# Patient Record
Sex: FEMALE | Race: AMERICAN INDIAN OR ALASKA NATIVE | NOT HISPANIC OR LATINO | URBAN - METROPOLITAN AREA
[De-identification: names, ages, dates, MRNs, and addresses within clinical notes are randomized per-mention and may not be internally consistent; named-entity substitution may affect disease eponyms.]

---

## 2017-03-29 ENCOUNTER — OUTPATIENT (OUTPATIENT)
Dept: OUTPATIENT SERVICES | Facility: HOSPITAL | Age: 82
LOS: 1 days | Discharge: HOME | End: 2017-03-29

## 2017-06-27 DIAGNOSIS — E11.65 TYPE 2 DIABETES MELLITUS WITH HYPERGLYCEMIA: ICD-10-CM

## 2017-06-27 DIAGNOSIS — E06.3 AUTOIMMUNE THYROIDITIS: ICD-10-CM

## 2017-06-27 DIAGNOSIS — E78.00 PURE HYPERCHOLESTEROLEMIA, UNSPECIFIED: ICD-10-CM

## 2017-09-20 ENCOUNTER — OUTPATIENT (OUTPATIENT)
Dept: OUTPATIENT SERVICES | Facility: HOSPITAL | Age: 82
LOS: 1 days | Discharge: HOME | End: 2017-09-20

## 2017-09-20 DIAGNOSIS — E11.65 TYPE 2 DIABETES MELLITUS WITH HYPERGLYCEMIA: ICD-10-CM

## 2017-09-20 DIAGNOSIS — R06.02 SHORTNESS OF BREATH: ICD-10-CM

## 2017-09-20 DIAGNOSIS — E06.3 AUTOIMMUNE THYROIDITIS: ICD-10-CM

## 2017-09-20 DIAGNOSIS — E78.00 PURE HYPERCHOLESTEROLEMIA, UNSPECIFIED: ICD-10-CM

## 2017-10-06 ENCOUNTER — EMERGENCY (EMERGENCY)
Facility: HOSPITAL | Age: 82
LOS: 0 days | Discharge: HOME | End: 2017-10-06

## 2017-10-06 DIAGNOSIS — E11.9 TYPE 2 DIABETES MELLITUS WITHOUT COMPLICATIONS: ICD-10-CM

## 2017-10-06 DIAGNOSIS — M25.532 PAIN IN LEFT WRIST: ICD-10-CM

## 2017-10-06 DIAGNOSIS — I10 ESSENTIAL (PRIMARY) HYPERTENSION: ICD-10-CM

## 2017-10-06 DIAGNOSIS — R06.02 SHORTNESS OF BREATH: ICD-10-CM

## 2017-10-06 DIAGNOSIS — Z79.899 OTHER LONG TERM (CURRENT) DRUG THERAPY: ICD-10-CM

## 2018-02-21 ENCOUNTER — OUTPATIENT (OUTPATIENT)
Dept: OUTPATIENT SERVICES | Facility: HOSPITAL | Age: 83
LOS: 1 days | Discharge: HOME | End: 2018-02-21

## 2018-02-21 DIAGNOSIS — N39.0 URINARY TRACT INFECTION, SITE NOT SPECIFIED: ICD-10-CM

## 2018-07-26 ENCOUNTER — OUTPATIENT (OUTPATIENT)
Dept: OUTPATIENT SERVICES | Facility: HOSPITAL | Age: 83
LOS: 1 days | Discharge: HOME | End: 2018-07-26

## 2018-07-26 DIAGNOSIS — E11.65 TYPE 2 DIABETES MELLITUS WITH HYPERGLYCEMIA: ICD-10-CM

## 2018-07-26 DIAGNOSIS — E78.00 PURE HYPERCHOLESTEROLEMIA, UNSPECIFIED: ICD-10-CM

## 2018-07-26 DIAGNOSIS — E06.3 AUTOIMMUNE THYROIDITIS: ICD-10-CM

## 2018-11-30 ENCOUNTER — OUTPATIENT (OUTPATIENT)
Dept: OUTPATIENT SERVICES | Facility: HOSPITAL | Age: 83
LOS: 1 days | Discharge: HOME | End: 2018-11-30

## 2018-11-30 DIAGNOSIS — D64.9 ANEMIA, UNSPECIFIED: ICD-10-CM

## 2018-11-30 DIAGNOSIS — E78.00 PURE HYPERCHOLESTEROLEMIA, UNSPECIFIED: ICD-10-CM

## 2018-11-30 DIAGNOSIS — I10 ESSENTIAL (PRIMARY) HYPERTENSION: ICD-10-CM

## 2019-06-12 ENCOUNTER — OUTPATIENT (OUTPATIENT)
Dept: OUTPATIENT SERVICES | Facility: HOSPITAL | Age: 84
LOS: 1 days | Discharge: HOME | End: 2019-06-12

## 2019-06-12 DIAGNOSIS — E78.00 PURE HYPERCHOLESTEROLEMIA, UNSPECIFIED: ICD-10-CM

## 2019-06-12 DIAGNOSIS — E11.65 TYPE 2 DIABETES MELLITUS WITH HYPERGLYCEMIA: ICD-10-CM

## 2019-06-12 DIAGNOSIS — E06.3 AUTOIMMUNE THYROIDITIS: ICD-10-CM

## 2019-06-12 DIAGNOSIS — M81.0 AGE-RELATED OSTEOPOROSIS WITHOUT CURRENT PATHOLOGICAL FRACTURE: ICD-10-CM

## 2019-12-09 ENCOUNTER — OUTPATIENT (OUTPATIENT)
Dept: OUTPATIENT SERVICES | Facility: HOSPITAL | Age: 84
LOS: 1 days | Discharge: HOME | End: 2019-12-09

## 2019-12-09 DIAGNOSIS — E11.65 TYPE 2 DIABETES MELLITUS WITH HYPERGLYCEMIA: ICD-10-CM

## 2019-12-09 DIAGNOSIS — E78.00 PURE HYPERCHOLESTEROLEMIA, UNSPECIFIED: ICD-10-CM

## 2020-11-13 ENCOUNTER — EMERGENCY (EMERGENCY)
Facility: HOSPITAL | Age: 85
LOS: 0 days | Discharge: HOME | End: 2020-11-14
Attending: STUDENT IN AN ORGANIZED HEALTH CARE EDUCATION/TRAINING PROGRAM | Admitting: STUDENT IN AN ORGANIZED HEALTH CARE EDUCATION/TRAINING PROGRAM
Payer: MEDICARE

## 2020-11-13 VITALS
DIASTOLIC BLOOD PRESSURE: 74 MMHG | RESPIRATION RATE: 18 BRPM | WEIGHT: 134.92 LBS | SYSTOLIC BLOOD PRESSURE: 181 MMHG | OXYGEN SATURATION: 99 % | HEART RATE: 90 BPM

## 2020-11-13 DIAGNOSIS — R20.0 ANESTHESIA OF SKIN: ICD-10-CM

## 2020-11-13 DIAGNOSIS — E78.5 HYPERLIPIDEMIA, UNSPECIFIED: ICD-10-CM

## 2020-11-13 DIAGNOSIS — R20.2 PARESTHESIA OF SKIN: ICD-10-CM

## 2020-11-13 DIAGNOSIS — E11.9 TYPE 2 DIABETES MELLITUS WITHOUT COMPLICATIONS: ICD-10-CM

## 2020-11-13 DIAGNOSIS — I10 ESSENTIAL (PRIMARY) HYPERTENSION: ICD-10-CM

## 2020-11-13 DIAGNOSIS — U07.1 COVID-19: ICD-10-CM

## 2020-11-13 DIAGNOSIS — G45.9 TRANSIENT CEREBRAL ISCHEMIC ATTACK, UNSPECIFIED: ICD-10-CM

## 2020-11-13 DIAGNOSIS — Z87.891 PERSONAL HISTORY OF NICOTINE DEPENDENCE: ICD-10-CM

## 2020-11-13 DIAGNOSIS — R29.810 FACIAL WEAKNESS: ICD-10-CM

## 2020-11-13 LAB
ALBUMIN SERPL ELPH-MCNC: 3.8 G/DL — SIGNIFICANT CHANGE UP (ref 3.5–5.2)
ALP SERPL-CCNC: 48 U/L — SIGNIFICANT CHANGE UP (ref 30–115)
ALT FLD-CCNC: 41 U/L — SIGNIFICANT CHANGE UP (ref 0–41)
ANION GAP SERPL CALC-SCNC: 10 MMOL/L — SIGNIFICANT CHANGE UP (ref 7–14)
APPEARANCE UR: CLEAR — SIGNIFICANT CHANGE UP
APTT BLD: 27.1 SEC — SIGNIFICANT CHANGE UP (ref 27–39.2)
AST SERPL-CCNC: 47 U/L — HIGH (ref 0–41)
BACTERIA # UR AUTO: NEGATIVE — SIGNIFICANT CHANGE UP
BASOPHILS # BLD AUTO: 0.03 K/UL — SIGNIFICANT CHANGE UP (ref 0–0.2)
BASOPHILS NFR BLD AUTO: 0.4 % — SIGNIFICANT CHANGE UP (ref 0–1)
BILIRUB SERPL-MCNC: 0.4 MG/DL — SIGNIFICANT CHANGE UP (ref 0.2–1.2)
BILIRUB UR-MCNC: NEGATIVE — SIGNIFICANT CHANGE UP
BUN SERPL-MCNC: 14 MG/DL — SIGNIFICANT CHANGE UP (ref 10–20)
CALCIUM SERPL-MCNC: 8.6 MG/DL — SIGNIFICANT CHANGE UP (ref 8.5–10.1)
CHLORIDE SERPL-SCNC: 101 MMOL/L — SIGNIFICANT CHANGE UP (ref 98–110)
CO2 SERPL-SCNC: 28 MMOL/L — SIGNIFICANT CHANGE UP (ref 17–32)
COLOR SPEC: SIGNIFICANT CHANGE UP
CREAT SERPL-MCNC: 0.7 MG/DL — SIGNIFICANT CHANGE UP (ref 0.7–1.5)
DIFF PNL FLD: NEGATIVE — SIGNIFICANT CHANGE UP
EOSINOPHIL # BLD AUTO: 0.07 K/UL — SIGNIFICANT CHANGE UP (ref 0–0.7)
EOSINOPHIL NFR BLD AUTO: 0.9 % — SIGNIFICANT CHANGE UP (ref 0–8)
EPI CELLS # UR: 1 /HPF — SIGNIFICANT CHANGE UP (ref 0–5)
GLUCOSE SERPL-MCNC: 117 MG/DL — HIGH (ref 70–99)
GLUCOSE UR QL: NEGATIVE — SIGNIFICANT CHANGE UP
HCT VFR BLD CALC: 22.9 % — LOW (ref 37–47)
HGB BLD-MCNC: 7.3 G/DL — LOW (ref 12–16)
HYALINE CASTS # UR AUTO: 0 /LPF — SIGNIFICANT CHANGE UP (ref 0–7)
IMM GRANULOCYTES NFR BLD AUTO: 0.4 % — HIGH (ref 0.1–0.3)
INR BLD: 0.9 RATIO — SIGNIFICANT CHANGE UP (ref 0.65–1.3)
KETONES UR-MCNC: SIGNIFICANT CHANGE UP
LEUKOCYTE ESTERASE UR-ACNC: ABNORMAL
LYMPHOCYTES # BLD AUTO: 1.92 K/UL — SIGNIFICANT CHANGE UP (ref 1.2–3.4)
LYMPHOCYTES # BLD AUTO: 24.5 % — SIGNIFICANT CHANGE UP (ref 20.5–51.1)
MCHC RBC-ENTMCNC: 27.4 PG — SIGNIFICANT CHANGE UP (ref 27–31)
MCHC RBC-ENTMCNC: 31.9 G/DL — LOW (ref 32–37)
MCV RBC AUTO: 86.1 FL — SIGNIFICANT CHANGE UP (ref 81–99)
MONOCYTES # BLD AUTO: 0.81 K/UL — HIGH (ref 0.1–0.6)
MONOCYTES NFR BLD AUTO: 10.3 % — HIGH (ref 1.7–9.3)
NEUTROPHILS # BLD AUTO: 4.98 K/UL — SIGNIFICANT CHANGE UP (ref 1.4–6.5)
NEUTROPHILS NFR BLD AUTO: 63.5 % — SIGNIFICANT CHANGE UP (ref 42.2–75.2)
NITRITE UR-MCNC: NEGATIVE — SIGNIFICANT CHANGE UP
NRBC # BLD: 0 /100 WBCS — SIGNIFICANT CHANGE UP (ref 0–0)
PH UR: 6.5 — SIGNIFICANT CHANGE UP (ref 5–8)
PLATELET # BLD AUTO: 228 K/UL — SIGNIFICANT CHANGE UP (ref 130–400)
POTASSIUM SERPL-MCNC: 4 MMOL/L — SIGNIFICANT CHANGE UP (ref 3.5–5)
POTASSIUM SERPL-SCNC: 4 MMOL/L — SIGNIFICANT CHANGE UP (ref 3.5–5)
PROT SERPL-MCNC: 6 G/DL — SIGNIFICANT CHANGE UP (ref 6–8)
PROT UR-MCNC: SIGNIFICANT CHANGE UP
PROTHROM AB SERPL-ACNC: 10.3 SEC — SIGNIFICANT CHANGE UP (ref 9.95–12.87)
RAPID RVP RESULT: DETECTED
RBC # BLD: 2.66 M/UL — LOW (ref 4.2–5.4)
RBC # FLD: 16.5 % — HIGH (ref 11.5–14.5)
RBC CASTS # UR COMP ASSIST: 2 /HPF — SIGNIFICANT CHANGE UP (ref 0–4)
SARS-COV-2 RNA SPEC QL NAA+PROBE: DETECTED
SODIUM SERPL-SCNC: 139 MMOL/L — SIGNIFICANT CHANGE UP (ref 135–146)
SP GR SPEC: 1.01 — SIGNIFICANT CHANGE UP (ref 1.01–1.03)
TROPONIN T SERPL-MCNC: <0.01 NG/ML — SIGNIFICANT CHANGE UP
UROBILINOGEN FLD QL: SIGNIFICANT CHANGE UP
WBC # BLD: 7.84 K/UL — SIGNIFICANT CHANGE UP (ref 4.8–10.8)
WBC # FLD AUTO: 7.84 K/UL — SIGNIFICANT CHANGE UP (ref 4.8–10.8)
WBC UR QL: 8 /HPF — HIGH (ref 0–5)

## 2020-11-13 PROCEDURE — 93010 ELECTROCARDIOGRAM REPORT: CPT

## 2020-11-13 PROCEDURE — 70450 CT HEAD/BRAIN W/O DYE: CPT | Mod: 26

## 2020-11-13 PROCEDURE — 99218: CPT | Mod: GC

## 2020-11-13 PROCEDURE — 71045 X-RAY EXAM CHEST 1 VIEW: CPT | Mod: 26

## 2020-11-13 NOTE — ED PROVIDER NOTE - PROGRESS NOTE DETAILS
Stroke Code called for NIHSS 0 <6h - seen by Neuro NP Min[place] - no need for CT Perfusion or CT Angios - if neg CT Head, recommend EDOU TIA workup.

## 2020-11-13 NOTE — ED PROVIDER NOTE - NS ED ROS FT
Constitutional: See HPI.  Eyes: No visual changes, eye pain or discharge. No Photophobia  ENMT: No hearing changes, pain, discharge or infections. No neck pain or stiffness. No limited ROM  Cardiac: No SOB or edema. No chest pain with exertion.  Respiratory: No cough or respiratory distress. No hemoptysis. No history of asthma or RAD.  GI: No nausea, vomiting, diarrhea or abdominal pain.  : No dysuria, frequency or burning. No Discharge  MS: No myalgia, muscle weakness, joint pain or back pain.  Neuro: see hpi   Skin: No skin rash.  Except as documented in the HPI, all other systems are negative.

## 2020-11-13 NOTE — ED PROVIDER NOTE - OBJECTIVE STATEMENT
84 yo f PMHx dm, and htn p/w acute onset left arm and face numbness starting an hour prior to arrival. Patient states that her arm numbness resolved but her facial weakness persisted On arrival a stroke code was activated bp 180/70  fs 108 and patients symptoms had resolved , nihss0.

## 2020-11-13 NOTE — ED CDU PROVIDER INITIAL DAY NOTE - ATTENDING CONTRIBUTION TO CARE
84 y/o F pmh htn, HLD, DM, placed in OBS for TIA w/up. had L sided facial and UE sx, resolved. NIHSS 0. CTH neg. Will fup MRI and echo, and reassess. pt has no complaints.

## 2020-11-13 NOTE — ED CDU PROVIDER INITIAL DAY NOTE - OBJECTIVE STATEMENT
84y/o female with pmh of htn, dm, hld, pt. c/o right hand numbness which started pta. sx resolved on arrival. denies ha, dizziness, focal weakness, cp, sob, fever, cough, slurred speech, abdominal pain. former smoker. pt. in obs under tia protocol.

## 2020-11-13 NOTE — CONSULT NOTE ADULT - SUBJECTIVE AND OBJECTIVE BOX
CC: left face and arm numbness       HPI:  86 yo right handed female with h/o dm, and htn p. w acute onset left arm and face numbness starting an hour prior to arrival. Patient states that her arm numbness resolved but her facial weakness persisted so she decided to come to the ed. On arrival a stroke code was activated bp 180/70  fs 108 and patients symptoms had resolved , nihss0.       Home Medications:  Asa 81 mg q 24  Does not remember the rest of the medication she takes        Social History  Denies smoking alcohol or drug use    Neuro Exam:  Orientation: oriented to person, place time and situation, speech and language is intact  Cranial Nerves: visual acuity intact bilaterally, visual fields full to confrontation, pupils equal round and reactive to light, extraocular motion intact, facial sensation intact symmetrically, face symmetrical, hearing was intact bilaterally, tongue and palate midline, head turning and shoulder shrug symmetric and there was no tongue deviation with protrusion.   Motor: 5/5 throughout/ no drift             No abnormal involuntary mvmts  Sensory exam:  intact and symmetric  Coordination:. no dysmetria or limb ataxia  Gait: deferred      NIHSS: 0  LOC 0   commands 0  questions 0  vf 0  Gaze 0  LUE 0  LLE 0  RUE 0  RLE 0  Face 0  Sensory 0  Ataxia 0  Speech 0  language 0  Extinction 0    ABCD2 4      Allergies    Allergy Status Unknown    Intolerances      MEDICATIONS  (STANDING):    MEDICATIONS  (PRN):      LABS:  POCT Blood Glucose (11.13.20 @ 21:26)   POCT Blood Glucose.: 108 mg/dL     Complete Blood Count + Automated Diff (11.13.20 @ 21:24)   WBC Count: 7.84 K/uL   RBC Count: 2.66 M/uL   Hemoglobin: 7.3 g/dL   Hematocrit: 22.9 %   Mean Cell Volume: 86.1 fL   Mean Cell Hemoglobin: 27.4 pg   Mean Cell Hemoglobin Conc: 31.9 g/dL   Red Cell Distrib Width: 16.5 %   Platelet Count - Automated: 228 K/uL   Auto Neutrophil #: 4.98 K/uL   Auto Lymphocyte #: 1.92 K/uL   Auto Monocyte #: 0.81 K/uL   Auto Eosinophil #: 0.07 K/uL   Auto Basophil #: 0.03 K/uL   Auto Neutrophil %: 63.5: Differential percentages must be correlated with absolute numbers for   clinical significance. %   Auto Lymphocyte %: 24.5 %   Auto Monocyte %: 10.3 %   Auto Eosinophil %: 0.9 %   Auto Basophil %: 0.4 %   Auto Immature Granulocyte %: 0.4 %   Nucleated RBC: 0 /100 WBCs         Neuro Imaging:  < from: CT Brain Stroke Protocol (11.13.20 @ 21:44) >  Findings:    Examination is limited in evaluation secondary to streak and motion artifact.    The ventricles, basal cisterns and sulcal pattern are within normal limits for the patient's stated age.    Bilateral basal ganglia calcification. Patchy periventricular deep white matter hypodensities consistent chronic microvascular ischemic changes. Bilateral chronic lacunar infarcts.    There is no acute mass effect, midline shift or intracranial hemorrhage.    The calvarium is intact.    The visualized paranasal sinuses and bilateral mastoid complexes are unremarkable. The globes and orbits are unremarkable.    Beam hardening artifact is noted overlying the brain stem and posterior fossa which is inherent to CT in this location.    Impression:    No CT evidence of acute intracranial hemorrhage or large territorial infarct.    Dr. Florencio Rodriguez discussed preliminary findings with ROSALINO MEJIA NP on 11/13/2020 9:49 PM with readback.          ******PRELIMINARY REPORT******    ******PRELIMINARY REPORT******          FLORENCIO RODRIGUEZ M.D., RESIDENT RADIOLOGIST    < end of copied text >    EEG:     Echo:   Carotid Doppler: N/A  Telemetry:

## 2020-11-13 NOTE — ED PROVIDER NOTE - ATTENDING CONTRIBUTION TO CARE
Pt here for 30min episode of R hand numbness that occurred 90 min ago.  No symptoms now.  No other complaints.    Exam: RRR, CTAB, NAD, NIHSS 0, GCS 15  Plan: labs, xr, ct head, ekg, neuro consult

## 2020-11-13 NOTE — ED ADULT TRIAGE NOTE - CHIEF COMPLAINT QUOTE
Amb with unsteady gait, c/o numbness and tingling to right hand/forearm and right side of face which began 1.5 hrs PTA, amb with crutch c/o LLE arthritis.  Brought directly to crit area, stroke code initiated, neuro wnl NIH 0

## 2020-11-13 NOTE — ED CDU PROVIDER INITIAL DAY NOTE - NEURO NEGATIVE STATEMENT, MLM
no loss of consciousness, no gait abnormality, no headache, + right hand numbness which resolved, no weakness.

## 2020-11-13 NOTE — ED CDU PROVIDER INITIAL DAY NOTE - MEDICAL DECISION MAKING DETAILS
86 y/o F pmh htn, HLD, DM, placed in OBS for TIA w/up. had L sided facial and UE sx, resolved. NIHSS 0. CTH neg. Will fup MRI and echo, and reassess. pt has no complaints.

## 2020-11-13 NOTE — CONSULT NOTE ADULT - ASSESSMENT
84 yo RHF with h/o dm, and htn p. w acute onset left arm and face numbness starting an hour prior to arrival.  On arrival a stroke code was activated bp 180/70  fs 108 and patients symptoms had resolved , nihss0. CTH was negative for acute intracranial findings. Patient was not a candidate for IV thrombolysis due to complete resolution of her symptoms. Case was discussed with neurology attending on call Dr Lenin Guerra.    TIA       Plan  N/C MRI BRAIN  MRA H/N  ECHO  HBAIC , LIPID PROFILE  Continue asa 81 mg q 24  start Lipitor 80 mg q 24

## 2020-11-14 VITALS
HEART RATE: 94 BPM | DIASTOLIC BLOOD PRESSURE: 80 MMHG | TEMPERATURE: 98 F | RESPIRATION RATE: 18 BRPM | SYSTOLIC BLOOD PRESSURE: 180 MMHG | OXYGEN SATURATION: 97 %

## 2020-11-14 LAB
A1C WITH ESTIMATED AVERAGE GLUCOSE RESULT: 6 % — HIGH (ref 4–5.6)
CHOLEST SERPL-MCNC: 165 MG/DL — SIGNIFICANT CHANGE UP
ESTIMATED AVERAGE GLUCOSE: 126 MG/DL — HIGH (ref 68–114)
HDLC SERPL-MCNC: 59 MG/DL — SIGNIFICANT CHANGE UP
LIPID PNL WITH DIRECT LDL SERPL: 92 MG/DL — SIGNIFICANT CHANGE UP
NON HDL CHOLESTEROL: 106 MG/DL — SIGNIFICANT CHANGE UP
TRIGL SERPL-MCNC: 92 MG/DL — SIGNIFICANT CHANGE UP

## 2020-11-14 PROCEDURE — 70551 MRI BRAIN STEM W/O DYE: CPT | Mod: 26

## 2020-11-14 PROCEDURE — 99217: CPT

## 2020-11-14 PROCEDURE — 93306 TTE W/DOPPLER COMPLETE: CPT | Mod: 26

## 2020-11-14 PROCEDURE — 70544 MR ANGIOGRAPHY HEAD W/O DYE: CPT | Mod: 26,59

## 2020-11-14 PROCEDURE — 70548 MR ANGIOGRAPHY NECK W/DYE: CPT | Mod: 26

## 2020-11-14 NOTE — ED CDU PROVIDER DISPOSITION NOTE - NSFOLLOWUPINSTRUCTIONS_ED_ALL_ED_FT
YOU ARE POSITIVE FOR COVID, YOU NEED TO QUARANTINE FOR 14 DAYS  Follow up with your PMD in 14 days  Continue all your home medications  Follow up with Neurology in 2 weeks

## 2020-11-14 NOTE — ED CDU PROVIDER DISPOSITION NOTE - CLINICAL COURSE
86 y/o F pmh htn, HLD, DM, placed in OBS for TIA w/up. had L sided facial and UE sx, resolved. NIHSS 0. CTH neg. Will fup MRI and echo, and reassess. pt has no complaints. COVID +    MRI neg for acute serious pathology. Echo WNL. I had extensive conversation with pt about COVID+ status, anemia. She denied resp/ ENT sx, abnl bleeding, v/d, fever. No syncope, cp, or SOB. I advised pt to quarantine/ social distance and much as possible, wash hands, use mask; also f/up with pmd regarding anemia. Pt understood, expressed eagerness to return home. Pt understands signs and symptoms for ED return. dc home.

## 2020-11-14 NOTE — ED CDU PROVIDER DISPOSITION NOTE - CARE PROVIDER_API CALL
Jn Perea  INTERNAL MEDICINE  [address] [place] [address]  Phone: [phone]  Fax: [phone]  Follow Up Time: 7-10 Days

## 2020-11-14 NOTE — ED CDU PROVIDER DISPOSITION NOTE - PATIENT PORTAL LINK FT
You can access the FollowMyHealth Patient Portal offered by [facility] by registering at the following website: [email or web address]. By joining [employer]’s FollowMyHealth portal, you will also be able to view your health information using other applications (apps) compatible with our system.

## 2020-11-14 NOTE — ED CDU PROVIDER SUBSEQUENT DAY NOTE - MEDICAL DECISION MAKING DETAILS
86 y/o F pmh htn, HLD, DM, placed in OBS for TIA w/up. had L sided facial and UE sx, resolved. NIHSS 0. CTH neg. Will fup MRI and echo, and reassess. pt has no complaints. COVID +

## 2020-11-14 NOTE — ED ADULT NURSE NOTE - PMH
[place] infant of 38 completed weeks of gestation
[place] infant of 38 completed weeks of gestation
DM (diabetes mellitus)    HLD (hyperlipidemia)    HTN (hypertension)

## 2020-11-14 NOTE — ED CDU PROVIDER DISPOSITION NOTE - NSFOLLOWUPCLINICS_GEN_ALL_ED_FT
[facility]  Neurology  [address] [place] [address], [facility] [address]  [place] [address]  Phone: [phone]  Fax:   Follow Up Time:

## 2020-11-14 NOTE — ED CDU PROVIDER SUBSEQUENT DAY NOTE - ATTENDING CONTRIBUTION TO CARE
84 y/o F pmh htn, HLD, DM, placed in OBS for TIA w/up. had L sided facial and UE sx, resolved. NIHSS 0. CTH neg. Will fup MRI and echo, and reassess. pt has no complaints. COVID +

## 2020-11-14 NOTE — ED CDU PROVIDER SUBSEQUENT DAY NOTE - PROGRESS NOTE DETAILS
no focal deficits on neuro exam, pt. asymptomatic, covid +, plan for bedside echo and mri/mra  as per neuro Arrived overnight, received from DEEDEE Gamez. Pt presenting under the TIA protocol. Pt complained of right hand numbness. CT Brain Stroke Protocol negative for any acute findings. Pt seen by neuro recommend MRI/MRA and ECHO. Pt is COVID POSITIVE. Will continue to monitor

## 2022-01-01 ENCOUNTER — APPOINTMENT (OUTPATIENT)
Dept: ENDOCRINOLOGY | Facility: CLINIC | Age: 87
End: 2022-01-01

## 2022-08-08 PROBLEM — I10 ESSENTIAL (PRIMARY) HYPERTENSION: Chronic | Status: ACTIVE | Noted: 2020-11-13

## 2022-08-08 PROBLEM — E11.9 TYPE 2 DIABETES MELLITUS WITHOUT COMPLICATIONS: Chronic | Status: ACTIVE | Noted: 2020-11-13

## 2022-08-08 PROBLEM — E78.5 HYPERLIPIDEMIA, UNSPECIFIED: Chronic | Status: ACTIVE | Noted: 2020-11-13

## 2022-10-17 PROBLEM — Z00.00 ENCOUNTER FOR PREVENTIVE HEALTH EXAMINATION: Status: ACTIVE | Noted: 2022-01-01

## 2023-01-01 ENCOUNTER — EMERGENCY (EMERGENCY)
Facility: HOSPITAL | Age: 88
LOS: 0 days | End: 2023-01-08
Attending: STUDENT IN AN ORGANIZED HEALTH CARE EDUCATION/TRAINING PROGRAM | Admitting: STUDENT IN AN ORGANIZED HEALTH CARE EDUCATION/TRAINING PROGRAM
Payer: MEDICARE

## 2023-01-01 VITALS — RESPIRATION RATE: 18 BRPM | OXYGEN SATURATION: 78 % | WEIGHT: 100.09 LBS

## 2023-01-01 DIAGNOSIS — I46.9 CARDIAC ARREST, CAUSE UNSPECIFIED: ICD-10-CM

## 2023-01-01 DIAGNOSIS — F03.90 UNSPECIFIED DEMENTIA WITHOUT BEHAVIORAL DISTURBANCE: ICD-10-CM

## 2023-01-01 DIAGNOSIS — E11.9 TYPE 2 DIABETES MELLITUS WITHOUT COMPLICATIONS: ICD-10-CM

## 2023-01-01 DIAGNOSIS — I10 ESSENTIAL (PRIMARY) HYPERTENSION: ICD-10-CM

## 2023-01-01 PROCEDURE — 99291 CRITICAL CARE FIRST HOUR: CPT

## 2023-01-08 NOTE — ED PROVIDER NOTE - CLINICAL SUMMARY MEDICAL DECISION MAKING FREE TEXT BOX
87-year-old female presented today in cardiac arrest.  Patient was reportedly unresponsive since 11 PM.  CPR was performed prehospital on arrival patient has received multiple epinephrines and had received multiple shocks for V. fib which deteriorated into asystole later on.  ACLS was continued during ED stay.  Patient received epinephrine, bicarbonate, calcium.  Patient did not have ROSC, had asystole on the monitor, had no palpable pulses and no cardiac activity on ultrasound at bedside.  Patient pronounced dead at 12:30 AM.  Patient's family member was informed when he arrived.

## 2023-01-08 NOTE — ED PROVIDER NOTE - PHYSICAL EXAMINATION
CONSTITUTIONAL: elderly  SKIN: warm, dry  HEAD: Normocephalic  EYES: no conjunctival injection. b/l dilated pupils unreactive  ENT: No nasal discharge  CARD: asystole  RESP: no breath sound right. intubated  ABD: soft nd. +PEG  EXT: Normal ROM.  No clubbing, cyanosis or edema.   NEURO: unresponsive

## 2023-01-08 NOTE — ED PROVIDER NOTE - OBJECTIVE STATEMENT
86 yo female, PMHx of HTN, DM, dementia, presents s/p cardiac arrest from [facility]. Patient was found down when EMS was called.

## 2023-01-08 NOTE — ED PROVIDER NOTE - ATTENDING CONTRIBUTION TO CARE
see [facility] for attending note    Attending Statement: I have personally provided the amount of critical care time documented below excluding time spent on separate procedures.     Critical Care Time Spent (min) Must be 30 or more minutes to qualify: 35.

## 2023-01-08 NOTE — ED ADULT NURSE NOTE - OBJECTIVE STATEMENT
BIBA from nursing home in active cardiac arrest. vas per EMS pt with 20 minutes of downtime, CPR performed, ROSC achieved in field, then went into asystole en route to ED. bicard, calcium & 6 epi's given by EMS PTA.

## 2023-01-08 NOTE — ED ADULT NURSE NOTE - NSIMPLEMENTINTERV_GEN_ALL_ED
Implemented All Fall with Harm Risk Interventions:  [place] to call system. Call bell, personal items and telephone within reach. Instruct patient to call for assistance. Room bathroom lighting operational. Non-slip footwear when patient is off stretcher. Physically safe environment: no spills, clutter or unnecessary equipment. Stretcher in lowest position, wheels locked, appropriate side rails in place. Provide visual cue, wrist band, yellow gown, etc. Monitor gait and stability. Monitor for mental status changes and reorient to person, place, and time. Review medications for side effects contributing to fall risk. Reinforce activity limits and safety measures with patient and family. Provide visual clues: red socks.

## 2023-01-10 LAB — GLUCOSE BLDC GLUCOMTR-MCNC: 332 MG/DL — HIGH (ref 70–99)

## 2024-09-21 NOTE — ED ADULT TRIAGE NOTE - CHIEF COMPLAINT QUOTE
BIBA from nursing home in active cardiac arrest   chest compressions being performed by EMS no blood in mucus/no chills/no drainage/no fever/no purulent drainage/no rectal pain/no redness/no vomiting